# Patient Record
Sex: MALE | Race: OTHER
[De-identification: names, ages, dates, MRNs, and addresses within clinical notes are randomized per-mention and may not be internally consistent; named-entity substitution may affect disease eponyms.]

---

## 2018-07-19 ENCOUNTER — HOSPITAL ENCOUNTER (EMERGENCY)
Dept: HOSPITAL 80 - FED | Age: 23
Discharge: HOME | End: 2018-07-19
Payer: COMMERCIAL

## 2018-07-19 VITALS — SYSTOLIC BLOOD PRESSURE: 130 MMHG | DIASTOLIC BLOOD PRESSURE: 80 MMHG
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
## 2018-07-19 DIAGNOSIS — S63.502A: Primary | ICD-10-CM
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
## 2018-07-19 DIAGNOSIS — S53.402A: ICD-10-CM

## 2018-07-19 PROCEDURE — L3807 WHFO W/O JOINTS PRE CST: HCPCS

## 2018-07-19 NOTE — EDPHY
General


Time Seen by Provider: 07/19/18 10:55


Narrative: 





CHIEF COMPLAINT: 


Fall, wrist pain





HISTORY OF PRESENT ILLNESS: 


Patient presents with complaints of left wrist pain after fall last night.  

This happened while long boarding.  He says he fell on outward stretched left 

hand.  He denies any head strike or loss of consciousness his only complaint is 

left wrist pain.  Radiates up to the elbow.  Worse with palpation and movement.

  No numbness, tingling weakness.  No laceration or puncture.  No injury 

elsewhere.  Right-hand dominant.  No other associated complaints or modifying 

factors.





DOMINANT EXTREMITY:


Right-handed





ESTABLISHED ORTHOPEDIST:


None





REVIEW OF SYSTEMS:


Ten systems reviewed and are negative unless otherwise noted in the HPI








PAST MEDICAL HISTORY: 


Uncomplicated





PAST SURGICAL HISTORY:


No surgical history





SOCIAL HISTORY:


Nonsmoker.  [employer] student and works as a  

for [employer]





FAMILY HISTORY:


Noncontributory





EXAMINATION


General Appearance:  Alert, no distress


HEENT:  Normocephalic and atraumatic.  Pupils equal round react.  No outward 

signs of trauma.


Cardiovascular:  Symmetric radial pulses 2+.  Brisk cap refill in the fingers 

left hand.


Neurological:  A&O, 2 point sensory symmetric,  and interossei strength 

symmetric


Skin:  Warm and dry, no rash.  No petechiae or purpura.  No puncture laceration


Extremities:  Circumferential tenderness of the left wrist with minimal 

tenderness of the left snuffbox.  There is some pain with extension of the left 

elbow but no tenderness to palpation of the elbow.  Range of motion of the 

upper extremities is symmetric with some pain left wrist.  Neurovascular intact 

distal left wrist pain.


Psychiatric:  Mood and affect normal








DIFFERENTIAL DIAGNOSES:


Including but not limited to wrist sprain, restrained, scaphoid fracture, 

scapholunate dislocation, radial head fracture, elbow sprain








MDM:


11:00 a.m.


Fall last night on outward stretched hand with left wrist pain and left elbow 

pain.  The x-ray of the wrist was performed for examination, I do feel he 

warrants an elbow x-ray as well.





11:05 a.m.


Case discussed with radiologist Dr. March.  There is questionable 

abnormality of the scaphoid.  He does not have exquisite tenderness of the 

snuffbox but does have mild tenderness.  I will treat this as a possible 

fracture.  Elbow x-ray is pending.





11:30 a.m.


X-ray of the elbow as read by me, without radiologist, reveals no evidence of 

radial head fracture.  He is able to fully range the elbow without difficulty.  

I have ordered a Velcro thumb spica to protect the left scaphoid.  He will be 

placed in this with instructions to keep this on at all times except when 

showering.  We discussed mandatory orthopedic follow-up due to the possible 

scaphoid injury.  We discussed ED precautions, ice, elevation anti-

inflammatories.  He is discharged home stable condition








SUPERVISION:


This patient was independently evaluated without direct involvement of or 

examination by the attending physician. 





ED Precautions: 


Worsening pain. Erythema, edema, cyanosis, pallor, paresthesia or anesthesia.








- Diagnostics


Imaging Results: 


 Imaging Impressions





Wrist X-Ray  07/19/18 10:38


Impression: A nondisplaced navicular fracture is not excluded.


 


Findings were discussed with Alvin Brasher PA-C at 11:06, on 7/19/2018. 

Consider conservative management and short-term repeat radiographic follow-up 

in 7-14 days, as clinically directed.


 








Elbow X-Ray  07/19/18 11:02


Impression: There is no acute osseous abnormality identified.














- History


Smoking Status: Never smoked





- Objective


Vital Signs: 


 Initial Vital Signs











Temperature (C)  98.1 F   07/19/18 10:17


 


Heart Rate  64   07/19/18 10:17


 


Respiratory Rate  16   07/19/18 10:17


 


Blood Pressure  142/81 H  07/19/18 10:17


 


O2 Sat (%)  95   07/19/18 10:17








 











O2 Delivery Mode               Room Air














Allergies/Adverse Reactions: 


 





No Known Allergies Allergy (Unverified 07/19/18 10:20)


 








Home Medications: 














 Medication  Instructions  Recorded


 


NK [No Known Home Meds]  07/19/18














Departure





- Departure


Disposition: Home, Routine, Self-Care


Clinical Impression: 


Left wrist sprain


Qualifiers:


 Encounter type: initial encounter Qualified Code(s): S63.502A - Unspecified 

sprain of left wrist, initial encounter





Sprain of elbow, left


Qualifiers:


 Encounter type: initial encounter Qualified Code(s): S53.402A - Unspecified 

sprain of left elbow, initial encounter





Condition: Good


Instructions:  Scaphoid Fracture (ED), Wrist Sprain (ED)


Additional Instructions: 


1. Medications as discussed as needed


2. Follow up with Orthopedics for definitive care


3. ED precautions as discussed for worsening pain, redness, fever, changes in 

range of motion, changes in sensation


4. Keep your left wrist splint in place at all times until seen by Orthopedics 

for definitive care


Referrals: 


Jerry Wilkins MD [Medical Doctor] - As per Instructions